# Patient Record
Sex: FEMALE | Race: WHITE
[De-identification: names, ages, dates, MRNs, and addresses within clinical notes are randomized per-mention and may not be internally consistent; named-entity substitution may affect disease eponyms.]

---

## 2017-06-11 ENCOUNTER — HOSPITAL ENCOUNTER (EMERGENCY)
Dept: HOSPITAL 7 - FB.ED | Age: 82
Discharge: HOME | End: 2017-06-11
Payer: MEDICARE

## 2017-06-11 VITALS — SYSTOLIC BLOOD PRESSURE: 139 MMHG | DIASTOLIC BLOOD PRESSURE: 50 MMHG

## 2017-06-11 DIAGNOSIS — I10: Primary | ICD-10-CM

## 2017-06-11 DIAGNOSIS — Z79.82: ICD-10-CM

## 2017-06-11 DIAGNOSIS — Z88.0: ICD-10-CM

## 2017-06-11 DIAGNOSIS — Z86.73: ICD-10-CM

## 2017-06-11 DIAGNOSIS — Z88.5: ICD-10-CM

## 2017-06-11 DIAGNOSIS — Z88.1: ICD-10-CM

## 2017-06-11 PROCEDURE — 36415 COLL VENOUS BLD VENIPUNCTURE: CPT

## 2017-06-11 PROCEDURE — 81001 URINALYSIS AUTO W/SCOPE: CPT

## 2017-06-11 PROCEDURE — 96361 HYDRATE IV INFUSION ADD-ON: CPT

## 2017-06-11 PROCEDURE — 96375 TX/PRO/DX INJ NEW DRUG ADDON: CPT

## 2017-06-11 PROCEDURE — 80048 BASIC METABOLIC PNL TOTAL CA: CPT

## 2017-06-11 PROCEDURE — 85025 COMPLETE CBC W/AUTO DIFF WBC: CPT

## 2017-06-11 PROCEDURE — 96374 THER/PROPH/DIAG INJ IV PUSH: CPT

## 2017-06-11 PROCEDURE — 99284 EMERGENCY DEPT VISIT MOD MDM: CPT

## 2017-06-12 NOTE — ER
DATE SEEN:  06/11/2017

 

HISTORY OF PRESENT ILLNESS:  The patient is an 86-year-old female, who presents

with dizziness.  She has difficulty seeing because of her glaucoma, has a

glaucoma procedure scheduled in a few weeks.  She says she has been getting

progressively more anxious about the procedure, has not been eating or drinking

well today, yesterday was able to drink okay.  Denies any headache.  She was

recently prescribed medication to help her sleep and help with her anxiety which

she just started taking them, but she is not sure if they have helped much.  Dr. Cortez prescribed those medications for her.  She said dizziness is more

pronounced when she goes from a sitting to standing position; when she is lying

flat, she does not have any.  She denies any shortness of breath or chest pain.

 

MEDICATIONS:

1. Timolol.

2. Mirtazapine.

3. Citalopram.

4. Gemfibrozil.

5. Aspirin.

6. Norvasc.

7. Levothyroxine.

8. Atenolol.

9. Lisinopril.

 

ALLERGIES:  Penicillin, prednisone, Bactrim.

 

PAST MEDICAL HISTORY:  Glaucoma, cerebral vascular accident, hypertension.

 

REVIEW OF SYSTEMS:  NEUROLOGIC:  Denies any focal weaknesses.

 

PHYSICAL EXAMINATION:  VITAL SIGNS:  Temperature is 36.7, pulse is 58, blood

pressure 151/72, respiratory rate 18, 98% on room air.  GENERAL:  She is no

apparent acute distress.  HEENT:  Pupils equal, round, responsive to light.

Extraocular muscles intact.  NECK:  Supple.  LUNGS:  Clear to auscultation.

HEART:  Regular rate and rhythm.  ABDOMEN:  Soft.  NEUROLOGICAL:  No focal

deficits.  No ataxia or dysmetria.  When performing a Hallpike maneuver, it does

not reproduce her symptoms.  Orthostatic vitals were taken and she is

orthostatic.

 

LABORATORY DATA:  CBC within normal limits.  Metabolic panel is within normal

limits.  Creatinine is 0.7.  Urinalysis within normal limits.

 

EMERGENCY DEPARTMENT COURSE:  She received a liter of normal saline, Zofran 4 mg

p.o.  She reports symptomatic improvement, able to walk more steady.  Dizziness

improved.  She also received Ativan 1 mg IV.

 

ASSESSMENT:  Dizziness, likely due to orthostatic hypotension and anxiety.

 

PLAN:  Continue current medications.  Increase fluid intake.  Follow up with

primary physician this week.

 

Job#: [id number]

DD: 06/11/2017 1833

DT: 06/12/2017 0233 IRAM

## 2017-06-13 ENCOUNTER — HOSPITAL ENCOUNTER (EMERGENCY)
Dept: HOSPITAL 7 - FB.ED | Age: 82
Discharge: HOME | End: 2017-06-13
Payer: MEDICARE

## 2017-06-13 VITALS — SYSTOLIC BLOOD PRESSURE: 156 MMHG | DIASTOLIC BLOOD PRESSURE: 72 MMHG

## 2017-06-13 DIAGNOSIS — R42: Primary | ICD-10-CM

## 2017-06-13 DIAGNOSIS — E03.9: ICD-10-CM

## 2017-06-14 NOTE — CR
INDICATION:  Dizziness, severe left fast component nystagmus.  



CHEST:  PA and lateral views of the chest, 06/13/2017, were compared with 09/19/
2008 and revealed the heart to be enlarged, the aorta tortuous and calcified in 
the arch and descending portion.  



Dextroconvex scoliosis of the upper middle thoracic spine is noted.  



Somewhat flattened diaphragm leaves, minimally prominent AP diameter, and mild 
hyperaeration, all suggest COPD along with interdigitation of diaphragm leaves.
  



A definite active infiltrate or effusion was not identified.  



IMPRESSION:  

1.  No acute process. 

2.  Probable progressive ASHD with cardiomegaly. 

3.  COPD. 

4.  Mild scoliosis.  

MTDD

## 2017-06-16 NOTE — ER
DATE SEEN:  2017

 

TIME SEEN:  The patient was seen at 1205 hours.

 

CHIEF COMPLAINT:  This 86-year-old woman comes in with history of dizziness,

nystagmus, anxiety, anorexia, depression, hypothyroidism, status post CVA

glaucoma with need for left eye surgery on  because the pressure is so

high, restlessness, dyslipidemia, and no history of falls.

 

HISTORY OF PRESENT ILLNESS:  The patient was seen 2 days ago in the emergency

room by Dr. Jones.  Noted to have dizziness, anorexia, anxiety, and Hallpike

maneuver was negative.  Laboratory studies were negative.  She was given Ativan

IV for her anxiety.  Daughter and the patient note that she had become just

weird, "dysphoric" after she left the ER.

 

She was flushed with IV fluids for orthostatic hypotension and now returns again

because she has persistent dizziness not resolved, meclizine does not help, and

anxiety is increased.

 

Decreased food intake, but she is drinking fluids.  She knows that the anorexia

and nausea is probably related to her dizziness.  She vomited the evening of

2017.  She is very anxious about having her upcoming cataract surgery on

.  Also anxious about her living situation.  Lives alone in an apartment

at [place].  Her daughter lives in [place] and another daughter lives

approximately 200 miles from here.

 

Apparently, she has less than an optimal relationship with her daughters, I am

not sure what that is all about.  See pertinent dictation below.

 

I later learned that one of her daughters - she is not on speaking terms.  They

have more or less a standoff relationship because apparently when her grandson

 she never spoke to her daughter, was never supportive, and the daughter

notes she has never been much of a mother to me in that regard.  Also, when the

patient's  went to the nursing home, she never visited.  The daughter

presents a history that she was quite selfish and she was quite cordial to other

people, but very much antagonistic to both daughters and not very much involved

with them nor does she listen to the daughters' advice.

 

According to one daughter, they were very concerned about her stability, her

walking, and want her very much to go to [facility].  The patient has been

very resistant to moving from her current place in [place] to [facility]

in [place].  It is an assisted living environment where she would be more

safe and the daughters feel she would have better care and better interaction

with people she knows.

 

No recent history of falls.

 

SIGNIFICANT PAST MEDICAL HISTORY:  Depression, CVA, hypothyroidism treated,

hypertension, right eye glaucoma, left eye is seriously elevated.  She has fear

of losing more of her eyesight in the right good eye.  Her left eye has very

poor vision.  Dyslipidemia, restless legs syndrome, and significant anxiety, and

documented 16 nystagmus on the chart and also had nystagmus on the last

visit, 2017, when seen by Dr. Jones.

 

Medications have not been changed recently.

 

She denies dehydration.  She feels she is eating and drinking, but her p.o.

intake and liquid intake is obviously decreased because she has had this

persistent dizziness and that is getting worse.

 

REVIEW OF SYSTEMS:  CARDIORESPIRATORY:  The patient denies chest pain, shortness

of breath, cough, or irregular heartbeat.  HEENT:  Denies headaches, difficulty

swallowing, or decreased hearing.  She denies neck pain or falls.

GASTROINTESTINAL:  Denies abdominal discomfort, diarrhea, constipation, change

in bowels, blood in her stool, black or tarry stool, reflux.  : Denies any

incontinence.  MUSCULOSKELETAL:  Denies joint pain, aching, or weakness, but she

uses a cane periodically.  NEUROLOGIC:  Previous CVA.  Denies seizures or

falling to one side or weakness of one extremity.

 

PHYSICAL EXAMINATION:  VITAL SIGNS:  Blood pressure 160/69, respirations 15,

heart rate 71 and regular, and oxygen saturation 98% on room air.  Orthostatic

blood pressure supine 152/83 and heart rate 89, sitting 165/83 and heart rate

53, standing blood pressure 161/77 and heart rate 57.  No evidence for

orthostatic changes.  In fact, it is surprising that with sitting and standing

her heart rate would come down as opposed to increase.  Perhaps the Tenormin has

caused her heart rate to go down as opposed to having a sympathetic response to

elevate.  She is taking Tenormin 50 mg b.i.d.  HEENT:  The patient has very

notable left fast component nystagmus, rapid beats.  I did not look into the

eyes - I did not check the retinas.  EOMs, she has conjugate gaze in spite of

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  She can sense light, but has very poor vision in

the left eye.  Hearing is good.  TMs negative.  NECK:  Supple.  No bruits in the

neck.  Swelling is appropriate.  Uvula midline.  Tongue is midline.  LUNGS:

Clear to auscultation without rales, rhonchi, or wheezes.  HEART:  S1, S2.  No

murmur.  No irregular rate or rhythm.  No S3, no S4.  ABDOMEN:  Soft.  No

guarding.  No abdominal discomfort.  NEUROLOGIC:  Deep tendon reflexes, upper

extremities hypoactive, absent knee jerks and ankle jerks.  Muscle strength in

dorsiflexion, plantar flexion of the foot is excellent.  Muscle strength left

and right upper extremity symmetrical and good.  Hand  is good.  She pushes

and pulls well.  Pronator drift negative.  Cranial nerves 2 through 12 intact

except for the vision.  No asymmetry of facial folds.  Gait is stable but

somewhat unsure because her vision is poor.  Romberg is negative.

 

LABORATORY DATA:  CPK is normal - no suggestion of myocardial infarction or

rhabdomyolysis from sitting around or lying around too long.  TSH is not

abnormal.  CMP and CBC are normal.  EKG; sinus rhythm in the 50s.

 

Chest x-ray, no infiltrate.  She has trace cardiomegaly, the transcardiac versus

transthoracic ratio is slightly over 50%, so it suggests very slight

cardiomegaly.  Her daughter notes that she had one chamber abnormality on an

echo that had previously been discussed and "it was slightly enlarged and less

functional than other chambers."  This suggests perhaps there may be some left

ventricular bulbous quality, but I think that is an overcall on the chest x-ray.

 

PROCEDURE:  I did perform a Hallpike maneuver.  It seemed to be more accentuated

on the left with the left lateral head slightly turned to the left compared to

the right.  With the fast component to the left, the Epley maneuver was

performed and did not make any difference in her dizziness.  However, with the

Epley maneuver, intermittently the nystagmus would completely relent.  It

relented for about a minute, then returned to same.  Also, when she would sit

up, the nystagmus relented.  When I tried to test the right side of her head,

the left-sided fast nystagmus persisted.  When I performed the lateral Epley 
maneuver - had her lie down and roll her

body with the head almost on the floor (at least 1.5 feet below the gurney

surface) and had her sit up rapidly, the

nystagmus relented for about a minute, and then came back at the same level it

was before.

 

The patient had Epley maneuver performed in physical therapy by Philip, the chief

physical therapist, and he felt this was not a semicircular canal-mediated

nystagmus.  He plans to see her later this week on Thursday, in 2 days, and

repeat more testing.  He did not feel there was a horizontal canal, semi-

circular canal abnormality.  It was my deduction that perhaps this was a

horizontal semicircular canal, as nystagmus on this visit was always been 
lateral and never

been vertical or oblique. Perhaps if Benji cannot get  resolution of  the 
vertigo with the sophisticated PT maneuvers she may have to go to vertigo 
clinic where computerized goggle measurement can confirm the presence or 
absence of semicircular canal  abnormalities. 

 

ASSESSMENT:

1. Dizziness and nystagmus are associated.  Does not appear to be a benign

    paroxysmal positional vertigo.

2. Etiology for dizziness is indeterminate.  Surprisingly, this does relent

    when she sits up for a minute or when she would rotate to the side, left or

    right with her head over the gurney, and closer to the floor position.

3. The latter two findings suggest there is some indirect relationship to

    semicircular canals.  Perhaps, further testing may elucidate this.

    However, I am not optimistic.  The surprising decrease of her vertigo with

    sitting up, may relate this is a blood pressure issue.  Perhaps the patient

    needs to have a higher blood pressure and the atenolol dose could be tried

    at a lower dose.  This is a guess.  The only way to know is to perform a

    trial of this and require more medical vigilance and closer followup.  I

    did not change her medicines in that regard.

 

OTHER DIAGNOSES:

1. Status post cerebrovascular accident. It is possibly the cerebrovascular

    accident may have affected her balance centers or the nystagmus and this is

    permanently present since the cerebrovascular accident.

2. Hypothyroidism, treated, stable.  TSH is normal.

3. Glaucoma increasing pain in left eye, perhaps the glaucoma may have

    something to do with accentuated nystagmus, but she has had nystagmus since

    2016 documented on the chart, so I think this is more a long-term

    neurological injury.  Perhaps the cerebrovascular accident that she

    experienced years ago is the cause of this.

4. Dyslipidemia.

5. Restless legs syndrome.

6. The patient did not respond to meclizine.

7. She became dysphoric with 1 mg of Ativan.

 

PLAN:

1. A trial of very low doses of clonazepam 0.25 mg or 0.5 mg b.i.d.  She will

    need to have the staff to be vigilant about her gait.  Should this cause

    her dizziness, she should discontinue this trial.

2. Her anxiety seems to be an overriding issue in her life.  She has

    apparently burned the bridges between her daughters and they are less than

    motivated to work with her since she has been contrarian to them most of

    their life.  The patient is to follow up with doctor in a week.

3. Also, the most important issue that was addressed today was the subsequent

    living place.  She has realized that she needs to change places where she

    lives and needs to go to [facility] for security.  Otherwise, I am

    afraid she would need to go directly to a nursing home for further care.

    She came to this conclusion, but that was not something that I pushed her

    to do, but have reinforced that.  Her daughters will be working to

    establish this and to help her make this transition.

 

Job#: [id number]

DD: 2017 [id number]

DT: 2017 EMERALD/SIM HAIDER

## 2017-07-02 ENCOUNTER — HOSPITAL ENCOUNTER (OUTPATIENT)
Dept: HOSPITAL 7 - FB.ED | Age: 82
Setting detail: OBSERVATION
LOS: 1 days | Discharge: HOME | End: 2017-07-03
Attending: FAMILY MEDICINE | Admitting: EMERGENCY MEDICINE
Payer: MEDICARE

## 2017-07-02 DIAGNOSIS — Z88.0: ICD-10-CM

## 2017-07-02 DIAGNOSIS — Z79.82: ICD-10-CM

## 2017-07-02 DIAGNOSIS — Z88.1: ICD-10-CM

## 2017-07-02 DIAGNOSIS — R42: ICD-10-CM

## 2017-07-02 DIAGNOSIS — E87.1: ICD-10-CM

## 2017-07-02 DIAGNOSIS — Z98.890: ICD-10-CM

## 2017-07-02 DIAGNOSIS — I10: Primary | ICD-10-CM

## 2017-07-02 DIAGNOSIS — Z88.8: ICD-10-CM

## 2017-07-02 DIAGNOSIS — Z79.899: ICD-10-CM

## 2017-07-02 DIAGNOSIS — R11.0: ICD-10-CM

## 2017-07-02 DIAGNOSIS — E03.9: ICD-10-CM

## 2017-07-02 DIAGNOSIS — F41.9: ICD-10-CM

## 2017-07-02 DIAGNOSIS — Z90.49: ICD-10-CM

## 2017-07-02 DIAGNOSIS — Z90.710: ICD-10-CM

## 2017-07-02 DIAGNOSIS — Z88.2: ICD-10-CM

## 2017-07-02 PROCEDURE — 87186 SC STD MICRODIL/AGAR DIL: CPT

## 2017-07-02 PROCEDURE — G0378 HOSPITAL OBSERVATION PER HR: HCPCS

## 2017-07-02 PROCEDURE — 81001 URINALYSIS AUTO W/SCOPE: CPT

## 2017-07-02 PROCEDURE — 93005 ELECTROCARDIOGRAM TRACING: CPT

## 2017-07-02 PROCEDURE — 99285 EMERGENCY DEPT VISIT HI MDM: CPT

## 2017-07-02 PROCEDURE — 87088 URINE BACTERIA CULTURE: CPT

## 2017-07-02 PROCEDURE — 80048 BASIC METABOLIC PNL TOTAL CA: CPT

## 2017-07-02 PROCEDURE — 96360 HYDRATION IV INFUSION INIT: CPT

## 2017-07-02 PROCEDURE — 96361 HYDRATE IV INFUSION ADD-ON: CPT

## 2017-07-02 PROCEDURE — 85027 COMPLETE CBC AUTOMATED: CPT

## 2017-07-02 PROCEDURE — 84484 ASSAY OF TROPONIN QUANT: CPT

## 2017-07-02 PROCEDURE — 36415 COLL VENOUS BLD VENIPUNCTURE: CPT

## 2017-07-02 PROCEDURE — 87086 URINE CULTURE/COLONY COUNT: CPT

## 2017-07-02 RX ADMIN — ATENOLOL SCH MG: 100 TABLET ORAL at 21:24

## 2017-07-02 NOTE — EDM.PDOC
ED HPI GENERAL MEDICAL PROBLEM





- General


Chief Complaint: General


Stated Complaint: LIGHT HEADED, WEAK AND ABD PAIN


Time Seen by Provider: 17 13:00


Source of Information: Reports: Patient, Old Records


History Limitations: Reports: No Limitations





- History of Present Illness


INITIAL COMMENTS - FREE TEXT/NARRATIVE: 





87 yo female here with a 2 week hx of nausea that is worse today. Had been seen 

for these sx's here about 2 weeks ago and nothing was found. Says she vomited a 

couple times today. Is mostly blind. Arrives via EMS. Reports a mild HA. No 

diarrhea. Some mild diffuse abdominal pain. Denies vertigo. Has anorexia. Had 

some chills today. No dysuria or cough or SOB. No chest pain. 


Onset: Other (approx 2 weeks ago.)


Duration: Week(s):, Getting Worse


Location: Reports: Head, Abdomen


Quality: Reports: Dull


Severity: Moderate


Improves with: Reports: None


Worsens with: Reports: Eating


Context: Reports: Other (unknown cause, recent negative work up. )


Associated Symptoms: Reports: Fever/Chills (no fever, only chills), Headaches (

mild today), Loss of Appetite, Malaise, Nausea/Vomiting, Syncope (not today, 

before last ER visit).  Denies: Chest Pain, Cough, Diaphoresis, Rash, Seizure, 

Shortness of Breath


Treatments PTA: Reports: Other (see below) (none today)


  ** Middle Abdomen


Pain Score (Numeric/FACES): 7





- Related Data


 Allergies











Allergy/AdvReac Type Severity Reaction Status Date / Time


 


Penicillins Allergy  Hives Verified 17 13:00


 


prednisone Allergy  Cannot Verified 17 13:00





   Remember  


 


sulfamethoxazole Allergy  Cannot Verified 17 13:00





[From Bactrim]   Remember  


 


Tetanus Vaccines and Toxoid Allergy  Swelling Verified 17 13:00


 


trimethoprim [From Bactrim] Allergy  Cannot Verified 17 13:00





   Remember  


 


steroids Allergy  Swollen Uncoded 17 13:00





   Eyes  











Home Meds: 


 Home Meds





Atenolol [Tenormin] 50 mg PO BID 16 [History]


Bimatoprost [LUMIGAN 0.01% Ophth Soln] 1 drop EYEBOTH BEDTIME 16 [History]


Levothyroxine 75 mg PO DAILY 16 [History]


Lisinopril 40 mg PO DAILY 16 [History]


Timolol Maleate [Timoptic 0.5% Ophth Soln] 1 drop EYEBOTH DAILY 16 [

History]


amLODIPine [Norvasc] 2.5 mg PO DAILY 16 [History]


Aspirin [Adult Low Dose Aspirin EC] 81 mg PO DAILY 17 [History]


Gemfibrozil [Lopid] 600 mg PO DAILY 17 [History]


ClonazePAM [KlonoPIN] 0.25 mg PO BID #20 tablet 17 [Rx]


Lutein/Minerals/Vit A,C & E [Ocuvite] 1 tab PO DAILY 17 [History]


Citalopram [Citalopram HBr] 10 mg PO DAILY 17 [History]


Ondansetron [Ondansetron Odt] 4 mg PO Q8HR 17 [History]











Past Medical History


HEENT History: Reports: Glaucoma


Cardiovascular History: Reports: Hypertension


Other Cardiovascular History: "MY HEART SKIPS A BEAT"


OB/GYN History: Reports: Pregnancy


Other OB/BYN History: 


Endocrine/Metabolic History: Reports: Other (See Below)


Other Endocrine/Metabolic History: THYROID





- Past Surgical History


HEENT Surgical History: Reports: Tonsillectomy


Female  Surgical History: Reports: Hysterectomy





Social & Family History





- Tobacco Use


Smoking Status *Q: Never Smoker


Second Hand Smoke Exposure: No





- Caffeine Use


Caffeine Use: Reports: Soda





- Recreational Drug Use


Recreational Drug Use: No





ED ROS GENERAL





- Review of Systems


Review Of Systems: See Below


Constitutional: Reports: Malaise


HEENT: Reports: No Symptoms


Respiratory: Reports: No Symptoms


Cardiovascular: Reports: No Symptoms


GI/Abdominal: Reports: Abdominal Pain (mild diffuse), Anorexia, Decreased 

Appetite, Nausea, Vomiting.  Denies: Constipation, Diarrhea, Distension, 

Hematemesis, Hematochezia, Melena


: Reports: No Symptoms


Musculoskeletal: Reports: No Symptoms


Skin: Reports: No Symptoms


Neurological: Reports: Dizziness (mild)


Psychiatric: Reports: No Symptoms





ED EXAM, GENERAL





- Physical Exam


Exam: See Below


Exam Limited By: No Limitations


General Appearance: Alert, WD/WN, No Apparent Distress


Eye Exam: Bilateral Eye: EOMI, Normal Inspection, PERRL


Ears: Normal External Exam, Normal Canal, Hearing Grossly Normal, Normal TMs


Ear Exam: Bilateral Ear: Auricle Normal, Canal Normal, TM normal


Nose: Normal Inspection, Normal Mucosa, No Blood


Throat/Mouth: Normal Inspection, Normal Lips, Normal Teeth, Normal Oropharynx, 

Normal Voice, No Airway Compromise


Head: Atraumatic, Normocephalic


Neck: Normal Inspection, Supple, Non-Tender


Respiratory/Chest: No Respiratory Distress, Lungs Clear, Normal Breath Sounds, 

No Accessory Muscle Use


Cardiovascular: Regular Rate, Rhythm, No Edema


GI/Abdominal: Normal Bowel Sounds, Soft, Non-Tender, No Distention


Back Exam: Normal Inspection


Extremities: Normal Inspection, Normal Range of Motion, Non-Tender, No Pedal 

Edema


Neurological: Alert, Oriented, CN II-XII Intact, Normal Cognition, No Motor/

Sensory Deficits


Psychiatric: Normal Affect, Normal Mood


Skin Exam: Warm, Dry, Intact, Normal Color, No Rash


Lymphatic: No Adenopathy





EKG INTERPRETATION


EKG Date: 17


Time: 13:10


Rhythm: NSR


Rate (Beats/Min): 56


Axis: Normal


P-Wave: Present


QRS: Normal


ST-T: Normal


QT: Normal


Comparison: No Change





Course





- Vital Signs


Text/Narrative:: 





Alprazolam 0.25 mg po, Zofran ODT 4 mg SL, Toradol 15 mg IM, amlodipine 2.5 mg 

po, atenolol 50 mg po, lisinopril 40 mg po, NS 1000 ml IV, cephalexin 500 mg po

, Maalox 30 ml po, Dulcolax supp 1 VT(declined), acetaminophen 1000 mg po


Last Recorded V/S: 


 Last Vital Signs











Temp  37.2 C   17 13:02


 


Pulse  56 L  17 13:02


 


Resp  16   17 13:02


 


BP  160/59 H  17 14:25


 


Pulse Ox  100   17 13:02














- Orders/Labs/Meds


Orders: 


 Active Orders 24 hr











 Category Date Time Status


 


 CULTURE URINE [RM] Stat Lab  17 13:55 Received


 


 Sodium Chloride 0.9% [Normal Saline] 1,000 ml Med  17 14:15 Active





 IV ASDIRECTED   


 


 Sodium Chloride 0.9% [Saline Flush] Med  17 13:24 Active





 10 ml FLUSH ASDIRECTED PRN   


 


 Saline Lock Insert [OM.PC] Routine Oth  17 13:24 Ordered


 


 EKG 12 Lead [EK] Routine Ther  17 13:07 Ordered








 Medication Orders





Sodium Chloride (Normal Saline)  1,000 mls @ 1,000 mls/hr IV ASDIRECTED THAIS


   Last Admin: 17 14:30  Dose: 1,000 mls/hr


Sodium Chloride (Saline Flush)  10 ml FLUSH ASDIRECTED PRN


   PRN Reason: Keep Vein Open


   Last Admin: 17 13:44  Dose: 10 ml








Labs: 


 Laboratory Tests











  17 Range/Units





  13:25 13:25 13:25 


 


WBC  5.8    (4.5-12.0)  X10-3/uL


 


RBC  4.40    (3.23-5.20)  x10(6)uL


 


Hgb  14.1    (11.5-15.5)  g/dL


 


Hct  41.7    (30.0-51.3)  %


 


MCV  94.8    (80-96)  fL


 


MCH  32.1    (27.7-33.6)  pg


 


MCHC  33.9    (32.2-35.4)  g/dL


 


RDW  11.8    (11.5-15.5)  %


 


Plt Count  226    (125-369)  X10(3)uL


 


Sodium   127 L   (135-145)  mmol/L


 


Potassium   5.0   (3.5-5.3)  mmol/L


 


Chloride   92 L D   (100-110)  mmol/L


 


Carbon Dioxide   25   (23-29)  mmol/L


 


BUN   10   (8-23)  mg/dL


 


Creatinine   0.8   (0.6-1.3)  mg/dL


 


Est Cr Clr Drug Dosing   TNP   


 


Estimated GFR (MDRD)   > 60   (>60)  


 


BUN/Creatinine Ratio   12.5   (9-20)  


 


Glucose   122 H   ()  mg/dL


 


Calcium   9.5   (8.6-10.2)  mg/dL


 


Troponin I    < 0.01 L  (0.02-0.06)  NG/ML


 


Urine Color     (YELLOW)  


 


Urine Appearance     (CLEAR)  


 


Urine pH     (5.0-6.5)  


 


Ur Specific Gravity     (1.010-1.025)  


 


Urine Protein     (NEGATIVE)  mg/dL


 


Urine Glucose (UA)     (NEGATIVE)  mg/dL


 


Urine Ketones     (NEGATIVE)  mg/dL


 


Urine Occult Blood     (NEGATIVE)  


 


Urine Nitrite     (NEGATIVE)  


 


Urine Bilirubin     (NEGATIVE)  


 


Urine Urobilinogen     (NEGATIVE)  mg/dL


 


Ur Leukocyte Esterase     (NEGATIVE)  


 


Urine WBC     (0)  


 


Ur Squamous Epith Cells     (NS,R,O)  


 


Urine Bacteria     (NS)  














  17 Range/Units





  13:55 


 


WBC   (4.5-12.0)  X10-3/uL


 


RBC   (3.23-5.20)  x10(6)uL


 


Hgb   (11.5-15.5)  g/dL


 


Hct   (30.0-51.3)  %


 


MCV   (80-96)  fL


 


MCH   (27.7-33.6)  pg


 


MCHC   (32.2-35.4)  g/dL


 


RDW   (11.5-15.5)  %


 


Plt Count   (125-369)  X10(3)uL


 


Sodium   (135-145)  mmol/L


 


Potassium   (3.5-5.3)  mmol/L


 


Chloride   (100-110)  mmol/L


 


Carbon Dioxide   (23-29)  mmol/L


 


BUN   (8-23)  mg/dL


 


Creatinine   (0.6-1.3)  mg/dL


 


Est Cr Clr Drug Dosing   


 


Estimated GFR (MDRD)   (>60)  


 


BUN/Creatinine Ratio   (9-20)  


 


Glucose   ()  mg/dL


 


Calcium   (8.6-10.2)  mg/dL


 


Troponin I   (0.02-0.06)  NG/ML


 


Urine Color  Yellow  (YELLOW)  


 


Urine Appearance  Slightly cloudy  (CLEAR)  


 


Urine pH  7.0 H  (5.0-6.5)  


 


Ur Specific Gravity  1.010  (1.010-1.025)  


 


Urine Protein  Negative  (NEGATIVE)  mg/dL


 


Urine Glucose (UA)  Normal  (NEGATIVE)  mg/dL


 


Urine Ketones  Negative  (NEGATIVE)  mg/dL


 


Urine Occult Blood  Negative  (NEGATIVE)  


 


Urine Nitrite  Negative  (NEGATIVE)  


 


Urine Bilirubin  Negative  (NEGATIVE)  


 


Urine Urobilinogen  Normal  (NEGATIVE)  mg/dL


 


Ur Leukocyte Esterase  Moderate H  (NEGATIVE)  


 


Urine WBC  20-30 H  (0)  


 


Ur Squamous Epith Cells  Few H  (NS,R,O)  


 


Urine Bacteria  Moderate H  (NS)  











Meds: 


Medications











Generic Name Dose Route Start Last Admin





  Trade Name Freq  PRN Reason Stop Dose Admin


 


Sodium Chloride  1,000 mls @ 1,000 mls/hr  17 14:15  17 14:30





  Normal Saline  IV   1,000 mls/hr





  ASDIRECTED THAIS   Administration


 


Sodium Chloride  10 ml  17 13:24  17 13:44





  Saline Flush  FLUSH   10 ml





  ASDIRECTED PRN   Administration





  Keep Vein Open   














Discontinued Medications














Generic Name Dose Route Start Last Admin





  Trade Name Freq  PRN Reason Stop Dose Admin


 


Acetaminophen  1,000 mg  17 16:33  17 16:39





  Tylenol Extra Strength  PO  17 16:34  1,000 mg





  ONETIME ONE   Administration


 


Al Hydroxide/Mg Hydroxide  30 ml  17 15:53  17 16:39





  Mag-Al Susp  PO  17 15:54  30 ml





  NOW STA   Administration


 


Alprazolam  0.25 mg  17 13:37  17 13:43





  Xanax  PO  17 13:38  0.25 mg





  NOW ONE   Administration


 


Amlodipine Besylate  2.5 mg  17 13:59  17 14:24





  Norvasc  PO  17 14:00  2.5 mg





  ONETIME ONE   Administration


 


Atenolol  50 mg  17 13:59  17 14:25





  Tenormin  PO  17 14:00  50 mg





  ONETIME ONE   Administration


 


Bisacodyl  10 mg  17 15:53  17 16:39





  Dulcolax  RECTAL  17 15:54  10 mg





  ONETIME ONE   Administration


 


Cephalexin  500 mg  17 14:27  17 14:47





  Keflex  PO  17 14:28  500 mg





  ONETIME ONE   Administration


 


Ketorolac Tromethamine  15 mg  17 13:10  17 13:14





  Toradol  IM  17 13:11  15 mg





  ONETIME ONE   Administration


 


Lisinopril  40 mg  17 13:59  17 14:25





  Prinivil  PO  17 14:00  40 mg





  NOW STA   Administration


 


Ondansetron HCl  4 mg  17 13:02  17 13:11





  Zofran Odt  PO  17 13:03  4 mg





  ONETIME ONE   Administration














Departure





- Departure


Time of Disposition: 17:15


Disposition: Refer to Observation


Condition: Fair


Clinical Impression: 


 Hyponatremia, Dizziness, Nausea








- Discharge Information


Forms:  ED Department Discharge





- My Orders


Last 24 Hours: 


My Active Orders





17 13:07


EKG 12 Lead [EK] Routine 





17 13:24


Sodium Chloride 0.9% [Saline Flush]   10 ml FLUSH ASDIRECTED PRN 


Saline Lock Insert [OM.PC] Routine 





17 13:55


CULTURE URINE [RM] Stat 





17 14:15


Sodium Chloride 0.9% [Normal Saline] 1,000 ml IV ASDIRECTED 














- Assessment/Plan


Last 24 Hours: 


My Active Orders





17 13:07


EKG 12 Lead [EK] Routine 





17 13:24


Sodium Chloride 0.9% [Saline Flush]   10 ml FLUSH ASDIRECTED PRN 


Saline Lock Insert [OM.PC] Routine 





17 13:55


CULTURE URINE [RM] Stat 





17 14:15


Sodium Chloride 0.9% [Normal Saline] 1,000 ml IV ASDIRECTED

## 2017-07-03 VITALS — DIASTOLIC BLOOD PRESSURE: 70 MMHG | SYSTOLIC BLOOD PRESSURE: 147 MMHG

## 2017-07-03 RX ADMIN — ATENOLOL SCH MG: 100 TABLET ORAL at 10:20

## 2017-07-03 NOTE — PCM.HP
H&P History of Present Illness





- General


Date of Service: 17


Admit Problem/Dx: 


 Admission Diagnosis/Problem





Admission Diagnosis/Problem      Hyponatremia








Source of Information: Patient, EMS Notes Reviewed, Old Records


History Limitations: Reports: No Limitations





- History of Present Illness


Initial Comments - Free Text/Narative: 





86-year-old female was admitted because of dizziness. The dizziness is gone for 

at least 2 months. The cause has been indeterminate. She has a history of CVA 

and is thought that could contribute, but she also has a history of anxiety and 

difficult to control. She lives at home alone, and has had problems with 

glaucoma as well. A plan is in place to admit her to [facility]. She denies 

any fever this morning noted that she have any nausea or vomiting she does have 

a headache that is mild to moderate. She has a history of hypertension 

dyslipidemia previously been well controlled.


  ** Middle Abdomen


Pain Score (Numeric/FACES): 7





  ** Headache


Pain Score (Numeric/FACES): 4





- Related Data


Allergies/Adverse Reactions: 


 Allergies











Allergy/AdvReac Type Severity Reaction Status Date / Time


 


Penicillins Allergy  Hives Verified 17 13:00


 


prednisone Allergy  Cannot Verified 17 13:00





   Remember  


 


sulfamethoxazole Allergy  Cannot Verified 17 13:00





[From Bactrim]   Remember  


 


Tetanus Vaccines and Toxoid Allergy  Swelling Verified 17 13:00


 


trimethoprim [From Bactrim] Allergy  Cannot Verified 17 13:00





   Remember  


 


steroids Allergy  Swollen Uncoded 17 13:00





   Eyes  











Home Medications: 


 Home Meds





Atenolol [Tenormin] 50 mg PO BID 16 [History]


Bimatoprost [LUMIGAN 0.01% Ophth Soln] 1 drop EYEBOTH BEDTIME 16 [History]


Levothyroxine 75 mg PO DAILY 16 [History]


Lisinopril 40 mg PO DAILY 16 [History]


Timolol Maleate [Timoptic 0.5% Ophth Soln] 1 drop EYEBOTH DAILY 16 [

History]


amLODIPine [Norvasc] 2.5 mg PO DAILY 16 [History]


Aspirin [Adult Low Dose Aspirin EC] 81 mg PO DAILY 17 [History]


Gemfibrozil [Lopid] 600 mg PO DAILY 17 [History]


ClonazePAM [KlonoPIN] 0.25 mg PO BID #20 tablet 17 [Rx]


Lutein/Minerals/Vit A,C & E [Ocuvite] 1 tab PO DAILY 17 [History]


Citalopram [Citalopram HBr] 10 mg PO DAILY 17 [History]


Ondansetron [Ondansetron Odt] 4 mg PO Q8HR 17 [History]











Past Medical History


HEENT History: Reports: Glaucoma


Cardiovascular History: Reports: Hypertension


Other Cardiovascular History: "MY HEART SKIPS A BEAT"


Respiratory History: Reports: None


Genitourinary History: Reports: None


OB/GYN History: Reports: Pregnancy


Other OB/BYN History: 


Musculoskeletal History: Reports: None


Neurological History: Reports: None


Endocrine/Metabolic History: Reports: Hypothyroidism, Other (See Below)


Other Endocrine/Metabolic History: THYROID


Hematologic History: Reports: None


Immunologic History: Reports: None


Oncologic (Cancer) History: Reports: None


Dermatologic History: Reports: None





- Infectious Disease History


Infectious Disease History: Reports: None





- Past Surgical History


Head Surgeries/Procedures: Reports: None


HEENT Surgical History: Reports: Tonsillectomy


Respiratory Surgical History: Reports: None


GI Surgical History: Reports: Appendectomy, Cholecystectomy


Female  Surgical History: Reports: Hysterectomy


Neurological Surgical History: Reports: None


Musculoskeletal Surgical History: Reports: None





Social & Family History





- Family History


Family Medical History: Noncontributory





- Tobacco Use


Smoking Status *Q: Never Smoker


Second Hand Smoke Exposure: No





- Caffeine Use


Caffeine Use: Reports: Soda


Other Caffeine Use: diet.





- Recreational Drug Use


Recreational Drug Use: No





H&P Review of Systems





- Review of Systems:


Review Of Systems: ROS reveals no pertinent complaints other than HPI.





Exam





- Exam


Exam: See Below





- Vital Signs


Vital Signs: 


 Last Vital Signs











Temp  96.9 F   17 04:25


 


Pulse  51 L  17 04:25


 


Resp  18   17 04:25


 


BP  149/69 H  17 04:25


 


Pulse Ox  98   17 04:25











Weight: 73.437 kg





- Exam


General: Alert, Oriented, 4


HEENT: PERRLA, Hearing Intact, Mucosa Moist & Pink, Nares Patent, Normal Nasal 

Septum, Posterior Pharynx Clear, Conjunctiva Clear, EOMI, EACs Clear, TMs Clear


Neck: Supple, Trachea Midline, 2


Lungs: Clear to Auscultation, Normal Respiratory Effort


Cardiovascular: Regular Rate, Regular Rhythm


Abdomen: Normal Bowel Sounds, Soft


 (Female) Exam: Deferred


Rectal (Female) Exam: Deferred


Back Exam: Normal Inspection, Full Range of Motion, NT


Extremities: 3, Normal Inspection, 10


Skin: Warm, Dry, Intact


Neurological: Cranial Nerves Intact, Reflexes Equal Bilateral


Neuro Extensive - Mental Status: Alert, Oriented x3, Normal Mood/Affect, Normal 

Cognition


Neuro Extensive - Motor, Sensory, Reflexes: CN II-XII Intact, Normal Gait, 

Normal Reflexes


Psychiatric: Alert, Normal Affect, Normal Mood





- Patient Data


Lab Results Last 24 hrs: 


 Laboratory Results - last 24 hr











  17 Range/Units





  06:30 


 


Sodium  132 L  (135-145)  mmol/L


 


Potassium  4.3  (3.5-5.3)  mmol/L


 


Chloride  99 L D  (100-110)  mmol/L


 


Carbon Dioxide  27  (23-29)  mmol/L


 


BUN  12  (8-23)  mg/dL


 


Creatinine  0.8  (0.6-1.3)  mg/dL


 


Est Cr Clr Drug Dosing  41.76  mL/min


 


Estimated GFR (MDRD)  > 60  (>60)  


 


BUN/Creatinine Ratio  15.0  (9-20)  


 


Glucose  92  ()  mg/dL


 


Calcium  8.4 L  (8.6-10.2)  mg/dL











Result Diagrams: 


 17 13:25





 17 06:30





*Q Meaningful Use (ADM)





- VTE *Q


VTE Criteria *Q: 








- Stroke *Q


Stroke Criteria *Q: 








- AMI *Q


AMI Criteria *Q: 








- Problem List


(1) HTN (hypertension)


SNOMED Code(s): 93891049


   ICD Code: I10 - ESSENTIAL (PRIMARY) HYPERTENSION   Status: Acute   Current 

Visit: Yes   


Qualifiers: 


   Hypertension type: essential hypertension   Qualified Code(s): I10 - 

Essential (primary) hypertension   





(2) Dizziness


SNOMED Code(s): 371983172, 975540589


   ICD Code: R42 - DIZZINESS AND GIDDINESS   Status: Acute   Current Visit: Yes

   





(3) Hyponatremia


SNOMED Code(s): 08620525


   ICD Code: E87.1 - HYPO-OSMOLALITY AND HYPONATREMIA   Status: Acute   Current 

Visit: Yes   





(4) Nausea


SNOMED Code(s): 369692086


   ICD Code: R11.0 - NAUSEA   Status: Acute   Current Visit: Yes   





(5) Anxiety


SNOMED Code(s): 68921360


   ICD Code: F41.9 - ANXIETY DISORDER, UNSPECIFIED   Status: Acute   Current 

Visit: Yes   


Problem List Initiated/Reviewed/Updated: Yes


Orders Last 24hrs: 


 Active Orders 24 hr











 Category Date Time Status


 


 Acetaminophen [Tylenol] Med  17 04:20 Active





 650 mg PO Q4H PRN   


 


 Aspirin [Halfprin] Med  17 09:00 Active





 81 mg PO DAILY   


 


 Atenolol [Tenormin] Med  17 21:00 Active





 50 mg PO BID   


 


 Bimatoprost [LUMIGAN 0.01% Ophth Soln] Med  17 21:00 Active





 0 ml EYEBOTH BEDTIME   


 


 Citalopram [Celexa] Med  17 09:00 Active





 10 mg PO DAILY   


 


 ClonazePAM [KlonoPIN] Med  17 21:00 Active





 0.25 mg PO BID   


 


 Gemfibrozil [Lopid] Med  17 09:00 Active





 600 mg PO DAILY   


 


 Levothyroxine Med  17 09:00 Active





 75,000 mcg PO DAILY   


 


 Lisinopril [Prinivil] Med  17 09:00 Active





 40 mg PO DAILY   


 


 Sodium Chloride 0.9% [Normal Saline] 1,000 ml Med  17 17:15 Active





 IV ASDIRECTED   


 


 Timolol Maleate [Timoptic 0.5% Ophth Soln] Med  17 09:00 Active





 0 ml EYEBOTH DAILY   


 


 amLODIPine [Norvasc] Med  17 09:00 Active





 2.5 mg PO DAILY   








 Medication Orders





Acetaminophen (Tylenol)  650 mg PO Q4H PRN


   PRN Reason: Headache


   Last Admin: 17 04:36  Dose: 650 mg


Amlodipine Besylate (Norvasc)  2.5 mg PO DAILY THAIS


Aspirin (Halfprin)  81 mg PO DAILY THAIS


Atenolol (Tenormin)  50 mg PO BID THAIS


   Last Admin: 17 21:24  Dose: 50 mg


Bimatoprost (Lumigan 0.01% Ophth Soln)  0 ml EYEBOTH BEDTIME THAIS


   Last Admin: 17 21:25  Dose: 1 drop


Citalopram Hydrobromide (Celexa)  10 mg PO DAILY [facility]


Clonazepam (Klonopin)  0.25 mg PO BID THAIS


   Last Admin: 17 22:18  Dose: 0.25 mg


Docusate Sodium (Colace)  100 mg PO BID PRN


   PRN Reason: Constipation


Gemfibrozil (Lopid)  600 mg PO DAILY [facility]


Sodium Chloride (Normal Saline)  1,000 mls @ 1,000 mls/hr IV ASDIRECTED THAIS


   Last Infusion: 17 17:37  Dose: 75 mls/hr


   Admin: 17 14:30  Dose: 1,000 mls/hr


Sodium Chloride (Normal Saline)  1,000 mls @ 75 mls/hr IV ASDIRECTED [facility]


   Last Admin: 17 06:29  Dose: 75 mls/hr


Levothyroxine Sodium (Levothyroxine)  75,000 mcg PO DAILY [facility]


Lisinopril (Prinivil)  40 mg PO DAILY [facility]


Ondansetron HCl (Zofran Odt)  4 mg PO Q6H PRN


   PRN Reason: nausea, able to take PO


Sodium Chloride (Saline Flush)  10 ml FLUSH ASDIRECTED PRN


   PRN Reason: Keep Vein Open


   Last Admin: 17 13:44  Dose: 10 ml


Timolol Maleate (Timoptic 0.5% Ophth Soln)  0 ml EYEBOTH DAILY [facility]








Assessment/Plan Comment:: 





Her sodium has improved this morning up to 132. She reports that as nausea and 

headache better. She also states that dizziness has improved. I will discharge 

her home today but I would suggest beginning Celexa 10 day to help anxiety. I 

also did recommend make Compazine 10 mg 3 times a day when necessary for nausea 

and physical therapy to see if this improves the symptoms.

## 2019-04-02 ENCOUNTER — HOSPITAL ENCOUNTER (EMERGENCY)
Dept: HOSPITAL 7 - FB.ED | Age: 84
Discharge: HOME | End: 2019-04-02
Payer: MEDICARE

## 2019-04-02 VITALS — DIASTOLIC BLOOD PRESSURE: 88 MMHG | SYSTOLIC BLOOD PRESSURE: 172 MMHG

## 2019-04-02 DIAGNOSIS — R60.0: ICD-10-CM

## 2019-04-02 DIAGNOSIS — E03.9: ICD-10-CM

## 2019-04-02 DIAGNOSIS — I10: ICD-10-CM

## 2019-04-02 DIAGNOSIS — Z79.899: ICD-10-CM

## 2019-04-02 DIAGNOSIS — R79.89: ICD-10-CM

## 2019-04-02 DIAGNOSIS — Z88.8: ICD-10-CM

## 2019-04-02 DIAGNOSIS — R20.2: Primary | ICD-10-CM

## 2019-04-02 PROCEDURE — 83880 ASSAY OF NATRIURETIC PEPTIDE: CPT

## 2019-04-02 PROCEDURE — 70450 CT HEAD/BRAIN W/O DYE: CPT

## 2019-04-02 PROCEDURE — 84484 ASSAY OF TROPONIN QUANT: CPT

## 2019-04-02 PROCEDURE — 86140 C-REACTIVE PROTEIN: CPT

## 2019-04-02 PROCEDURE — 81001 URINALYSIS AUTO W/SCOPE: CPT

## 2019-04-02 PROCEDURE — 99284 EMERGENCY DEPT VISIT MOD MDM: CPT

## 2019-04-02 PROCEDURE — 85025 COMPLETE CBC W/AUTO DIFF WBC: CPT

## 2019-04-02 PROCEDURE — 36415 COLL VENOUS BLD VENIPUNCTURE: CPT

## 2019-04-02 PROCEDURE — 93005 ELECTROCARDIOGRAM TRACING: CPT

## 2019-04-02 PROCEDURE — 80053 COMPREHEN METABOLIC PANEL: CPT

## 2019-04-02 PROCEDURE — 71046 X-RAY EXAM CHEST 2 VIEWS: CPT

## 2019-04-02 NOTE — CT
INDICATION:  New confusion.



CT HEAD WITHOUT CONTRAST:  Spiral examination of the brain was obtained with 
sagittal and coronal reconstructions.  Initial axial images were obtained 04/02/
19 and findings were compared with 04/22/16.  Total exam DLP = 1,219.22 mGy-cm.



There is now noted postsurgical change at both globes with the orbits otherwise 
unremarkable.



Visualized paranasal sinuses and mastoid air cells appear to be well aerated.  



No cranial abnormality was seen.  



No shift of midline structures was seen.



Ventricles are slightly more prominent than on the previous examination, 
suggesting a mild degree of progressive central atrophy.  Cortical sulci are 
also slightly more prominent in appearance, suggesting mildly progressive 
cortical atrophy.  



White matter changes compatible with a mild degree of microvascular disease 
also appears slightly progressive.  



Calcifications are noted in the internal carotid and right vertebral arteries.  



No bleeding site or hematoma was seen - no definite acute intracranial 
abnormality was identified.  



IMPRESSION:

1.  No acute intracranial abnormality.

2.  Progressive mild microvascular disease type changes in the white matter 
with progressive generalized atrophy also to a mild degree.

3.  Cerebrovascular disease with arterial calcifications. 

4.  Postsurgical globes now bilateral - new on the left.  



Report was called to Dr. Mathur at 1055 hours on 04/02/19.

[id number]

## 2019-04-02 NOTE — EDM.PDOC
ED HPI GENERAL MEDICAL PROBLEM





- General


Stated Complaint: LEFT SIDE WEAKNESS


Time Seen by Provider: 19 09:40


Source of Information: Reports: Patient


History Limitations: Reports: No Limitations





- History of Present Illness


INITIAL COMMENTS - FREE TEXT/NARRATIVE: 





c/o tingling LUE





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





reported tingling of LUE, now no c/o tingle or pain





does have inc'd /87 most recently, however not given her AM pills, will 

give them here





does have mild confusion that pt says is new, knew day of week and month but 

could not think of year





no f/c/d, no n/v





has 2 daughters, one in [facility], one in [facility]





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 

anxiety, hyponatremia, enlarged heart





pt with Enterococcus faecalis UTI  when last seen in ED, sens all except 

moxiflox and TET





last head CT 2y ago, will repeat altho suspect infectious etiology for 

paresthesias and mild confusion





uses a walker, not fallen in past 6m





- Related Data


 Allergies











Allergy/AdvReac Type Severity Reaction Status Date / Time


 


acetazolamide Allergy  Other Verified 19 10:01





[From Diamox Sequels]     


 


atorvastatin [From Lipitor] Allergy  Other Verified 19 10:01


 


dorzolamide Allergy  Other Verified 19 10:01


 


Influenza Virus Vaccines Allergy  Other Verified 19 10:01


 


Penicillins Allergy  Hives Verified 17 13:00


 


prednisone Allergy  Cannot Verified 17 13:00





   Remember  


 


sulfamethoxazole Allergy  Cannot Verified 17 13:00





[From Bactrim]   Remember  


 


Tetanus Vaccines and Toxoid Allergy  Swelling Verified 17 13:00


 


trimethoprim [From Bactrim] Allergy  Cannot Verified 17 13:00





   Remember  


 


steroids Allergy  Swollen Uncoded 17 13:00





   Eyes  











Home Meds: 


 Home Meds





Bimatoprost [LUMIGAN 0.01% Ophth Soln] 1 drop EYEBOTH BEDTIME 16 [History]


Levothyroxine 75 mcg PO DAILY 16 [History]


Lisinopril 40 mg PO DAILY 16 [History]


Timolol Maleate [Timoptic 0.5% Ophth Soln] 1 drop EYELF BID 16 [History]


Aspirin [Adult Low Dose Aspirin EC] 81 mg PO DAILY 17 [History]


ClonazePAM [KlonoPIN] 0.25 mg PO BID #20 tablet 17 [Rx]


Lutein/Minerals/Vit A,C & E [Ocuvite] 1 tab PO DAILY 17 [History]


Citalopram [Citalopram HBr] 10 mg PO DAILY 17 [History]


Cranberry Extract [Cranberry] 300 mg PO DAILY 19 [History]


Furosemide [Lasix] 20 mg PO DAILY 19 [History]


Loratadine 10 mg PO DAILY 19 [History]


Metoprolol Tartrate 50 mg PO DAILY 19 [History]


Omega-3/DHA/Epa/Fish Oil [Omega 3 500 Softgel] 1,000 mg PO DAILY 19 [

History]











Past Medical History


HEENT History: Reports: Glaucoma


Cardiovascular History: Reports: Hypertension


Other Cardiovascular History: "MY HEART SKIPS A BEAT"


Respiratory History: Reports: None


Genitourinary History: Reports: None


OB/GYN History: Reports: Pregnancy


Other OB/GYN History: 


Musculoskeletal History: Reports: None


Neurological History: Reports: None


Endocrine/Metabolic History: Reports: Hypothyroidism, Other (See Below)


Other Endocrine/Metabolic History: THYROID


Hematologic History: Reports: None


Immunologic History: Reports: None


Oncologic (Cancer) History: Reports: None


Dermatologic History: Reports: None





- Infectious Disease History


Infectious Disease History: Reports: None





- Past Surgical History


Head Surgeries/Procedures: Reports: None


HEENT Surgical History: Reports: Tonsillectomy


Respiratory Surgical History: Reports: None


GI Surgical History: Reports: Appendectomy, Cholecystectomy


Female  Surgical History: Reports: Hysterectomy


Neurological Surgical History: Reports: None


Musculoskeletal Surgical History: Reports: None





Social & Family History





- Family History


Family Medical History: Noncontributory





- Caffeine Use


Caffeine Use: Reports: Soda


Other Caffeine Use: diet.





ED ROS GENERAL





- Review of Systems


Review Of Systems: See Below


Constitutional: Reports: No Symptoms


HEENT: Reports: No Symptoms


Respiratory: Reports: No Symptoms


Cardiovascular: Reports: No Symptoms


Endocrine: Reports: No Symptoms


GI/Abdominal: Reports: No Symptoms


: Reports: No Symptoms


Musculoskeletal: Reports: No Symptoms


Skin: Reports: No Symptoms


Neurological: Reports: Confusion, Paresthesia


Psychiatric: Reports: No Symptoms


Hematologic/Lymphatic: Reports: No Symptoms


Immunologic: Reports: No Symptoms





ED EXAM, GENERAL





- Physical Exam


Exam: See Below


General Appearance: Alert, WD/WN, No Apparent Distress, Other (alert, pleasant, 

follows commands, poor memory, talk complete sentence, NAD, nontoxic)


Eye Exam: Bilateral Eye: Other (dense opacity L cornea, R pupil 3 mm, reactive 

light, cannot count fingers)


Ears: Normal External Exam, Normal Canal, Hearing Grossly Normal


Nose: Normal Inspection, Normal Mucosa, No Blood


Throat/Mouth: Normal Inspection, Normal Lips, Normal Voice, No Airway Compromise


Head: Atraumatic, Normocephalic


Neck: Normal Inspection, Supple, Non-Tender, Full Range of Motion.  No: 

Lymphadenopathy (R), Lymphadenopathy (L)


Respiratory/Chest: No Respiratory Distress, Lungs Clear, Normal Breath Sounds, 

No Accessory Muscle Use, Chest Non-Tender


Cardiovascular: Regular Rate, Rhythm, Other (2/6 MAXIME at LSB, 2+ edema to knees b

/l, trace above knees, symmetric, shiny pretib skin and hairless c/w VSD)


GI/Abdominal: Normal Bowel Sounds, Soft, Non-Tender, No Distention, Other (

prominent, rounded).  No: Guarding, Rigid, Rebound


Back Exam: Normal Inspection, Full Range of Motion.  No: CVA Tenderness (R), 

CVA Tenderness (L)


Extremities: Non-Tender


Neurological: Alert, CN II-XII Intact, No Motor/Sensory Deficits, Other (hand 

 5/5, ankle flex/ext 5/5)


Psychiatric: Normal Affect, Normal Mood


Skin Exam: Warm, Dry, Intact, Normal Color, No Rash


Lymphatic: No Adenopathy





Course





- Vital Signs


Last Recorded V/S: 


 Last Vital Signs











Temp  36.8 C   19 09:25


 


Pulse  70   19 10:08


 


Resp  18   19 09:25


 


BP  199/101 H  19 10:08


 


Pulse Ox  97   19 09:25














- Orders/Labs/Meds


Orders: 


 Active Orders 24 hr











 Category Date Time Status


 


 EKG Documentation Completion [RC] ASDIRECTED Care  19 09:56 Active


 


 Head wo Cont [CT] Stat Exams  19 09:54 Taken


 


 EKG 12 Lead [EK] Routine Ther  19 09:55 Ordered











Labs: 


 Laboratory Tests











  19 Range/Units





  10:15 10:15 10:15 


 


WBC  5.2    (4.5-12.0)  X10-3/uL


 


RBC  4.83    (3.23-5.20)  x10(6)uL


 


Hgb  15.4    (11.5-15.5)  g/dL


 


Hct  46.2    (30.0-51.3)  %


 


MCV  95.6    (80-96)  fL


 


MCH  31.9    (27.7-33.6)  pg


 


MCHC  33.4    (32.2-35.4)  g/dL


 


RDW  12.7    (11.5-15.5)  %


 


Plt Count  167    (125-369)  X10(3)uL


 


MPV  7.0 L    (7.4-10.4)  fL


 


Add Manual Diff  Yes    


 


Neutrophils % (Manual)  77    (46-82)  %


 


Lymphocytes % (Manual)  17    (13-37)  %


 


Monocytes % (Manual)  5    (4-12)  %


 


Eosinophils % (Manual)  1    (0-5)  %


 


Sodium   141   (135-145)  mmol/L


 


Potassium   4.6   (3.5-5.3)  mmol/L


 


Chloride   103   (100-110)  mmol/L


 


Carbon Dioxide   33 H   (21-32)  mmol/L


 


BUN   11   (7-18)  mg/dL


 


Creatinine   0.8   (0.55-1.02)  mg/dL


 


Est Cr Clr Drug Dosing   TNP   


 


Estimated GFR (MDRD)   > 60   (>60)  


 


BUN/Creatinine Ratio   13.8   (9-20)  


 


Glucose   126 H   ()  mg/dL


 


Calcium   8.8   (8.6-10.2)  mg/dL


 


Total Bilirubin   0.8   (0.1-1.3)  mg/dL


 


AST   20   (5-25)  IU/L


 


ALT   16   (12-36)  U/L


 


Alkaline Phosphatase   64   ()  IU/L


 


Troponin I    < 0.017 L  (<0.017-0.056)  ng/mL


 


C-Reactive Protein    0.4 L  (0.5-0.9)  mg/dL


 


NT-Pro-B Natriuret Pep    2321 H*  (<=450)  pg/mL


 


Total Protein   7.1   (6.0-8.0)  g/dL


 


Albumin   3.8   (3.2-4.6)  g/dL


 


Globulin   3.3   g/dL


 


Albumin/Globulin Ratio   1.2   


 


Urine Color     (YELLOW)  


 


Urine Appearance     (CLEAR)  


 


Urine pH     (5.0-6.5)  


 


Ur Specific Gravity     (1.010-1.025)  


 


Urine Protein     (NEGATIVE)  mg/dL


 


Urine Glucose (UA)     (NORMAL)  mg/dL


 


Urine Ketones     (NEGATIVE)  mg/dL


 


Urine Occult Blood     (NEGATIVE)  


 


Urine Nitrite     (NEGATIVE)  


 


Urine Bilirubin     (NEGATIVE)  


 


Urine Urobilinogen     (NEGATIVE)  mg/dL


 


Ur Leukocyte Esterase     (NEGATIVE)  


 


Urine WBC     (0-5)  


 


Ur Squamous Epith Cells     (NS,R,O)  


 


Urine Bacteria     (NS)  














  19 Range/Units





  10:45 


 


WBC   (4.5-12.0)  X10-3/uL


 


RBC   (3.23-5.20)  x10(6)uL


 


Hgb   (11.5-15.5)  g/dL


 


Hct   (30.0-51.3)  %


 


MCV   (80-96)  fL


 


MCH   (27.7-33.6)  pg


 


MCHC   (32.2-35.4)  g/dL


 


RDW   (11.5-15.5)  %


 


Plt Count   (125-369)  X10(3)uL


 


MPV   (7.4-10.4)  fL


 


Add Manual Diff   


 


Neutrophils % (Manual)   (46-82)  %


 


Lymphocytes % (Manual)   (13-37)  %


 


Monocytes % (Manual)   (4-12)  %


 


Eosinophils % (Manual)   (0-5)  %


 


Sodium   (135-145)  mmol/L


 


Potassium   (3.5-5.3)  mmol/L


 


Chloride   (100-110)  mmol/L


 


Carbon Dioxide   (21-32)  mmol/L


 


BUN   (7-18)  mg/dL


 


Creatinine   (0.55-1.02)  mg/dL


 


Est Cr Clr Drug Dosing   


 


Estimated GFR (MDRD)   (>60)  


 


BUN/Creatinine Ratio   (9-20)  


 


Glucose   ()  mg/dL


 


Calcium   (8.6-10.2)  mg/dL


 


Total Bilirubin   (0.1-1.3)  mg/dL


 


AST   (5-25)  IU/L


 


ALT   (12-36)  U/L


 


Alkaline Phosphatase   ()  IU/L


 


Troponin I   (<0.017-0.056)  ng/mL


 


C-Reactive Protein   (0.5-0.9)  mg/dL


 


NT-Pro-B Natriuret Pep   (<=450)  pg/mL


 


Total Protein   (6.0-8.0)  g/dL


 


Albumin   (3.2-4.6)  g/dL


 


Globulin   g/dL


 


Albumin/Globulin Ratio   


 


Urine Color  Yellow  (YELLOW)  


 


Urine Appearance  Clear  (CLEAR)  


 


Urine pH  7.0 H  (5.0-6.5)  


 


Ur Specific Gravity  1.005 L  (1.010-1.025)  


 


Urine Protein  Negative  (NEGATIVE)  mg/dL


 


Urine Glucose (UA)  Normal  (NORMAL)  mg/dL


 


Urine Ketones  Negative  (NEGATIVE)  mg/dL


 


Urine Occult Blood  Negative  (NEGATIVE)  


 


Urine Nitrite  Negative  (NEGATIVE)  


 


Urine Bilirubin  Negative  (NEGATIVE)  


 


Urine Urobilinogen  Normal  (NEGATIVE)  mg/dL


 


Ur Leukocyte Esterase  Negative  (NEGATIVE)  


 


Urine WBC  0-5  (0-5)  


 


Ur Squamous Epith Cells  Few H  (NS,R,O)  


 


Urine Bacteria  Few H  (NS)  











Meds: 


Medications














Discontinued Medications














Generic Name Dose Route Start Last Admin





  Trade Name Freq  PRN Reason Stop Dose Admin


 


Lisinopril  40 mg  19 09:52  19 10:07





  Prinivil  PO  19 09:53  40 mg





  ONETIME ONE   Administration





     





     





     





     


 


Metoprolol Tartrate  50 mg  19 09:53  19 10:08





  Lopressor  PO  19 09:54  50 mg





  ONETIME ONE   Administration





     





     





     





     














- Re-Assessments/Exams


Free Text/Narrative Re-Assessment/Exam: 





19 12:07


head CT without is neg for old or new CVA as per Dr Fox, slight increase in 

microvascular changes c/w previous





CxR 2v with inc'd cardiomegaly without evidence of failure





u/a neg for infection





trop, CRP neg





Na WNL, inc'd BNP, however no comparison, HF does appear reasonably well 

compensated





EKG with afiab with rate control at 67, EKG  with SR at 56, call to PCP Dr Cortez showed afib present 1y ago





Dr Cortez recommended no change in meds with f/u with him or Benji Herrera next 

week





[facility], was inc'd on arrival but had not taken AM 

meds, which were given here, last /79, will get a 2nd dose of metoprolol 

at supper, meds can be adjusted next week by PCP





no evidence of CVA, cause of transient tingling in LUE is multifactorial with 

no acute process identiefied





will continue on ASA 81 mg daily





Departure





- Departure


Time of Disposition: 12:12


Disposition: Home, Self-Care 01


Condition: Good


Clinical Impression: 


 Paresthesia of left arm, Elevated blood pressure reading, Peripheral edema, 

Elevated brain natriuretic peptide (BNP) level








- Discharge Information


*PRESCRIPTION DRUG MONITORING PROGRAM REVIEWED*: Not Applicable


*COPY OF PRESCRIPTION DRUG MONITORING REPORT IN PATIENT STEPHANY: Not Applicable


Additional Instructions: 


Continue current meds.





Check and record BP 2 times daily for next week and take in report to PCP.





See Dr Cortez or Benji Herrera in one week.





- My Orders


Last 24 Hours: 


My Active Orders





19 09:54


Head wo Cont [CT] Stat 





19 09:55


EKG 12 Lead [EK] Routine 





19 09:56


EKG Documentation Completion [RC] ASDIRECTED 














- Assessment/Plan


Last 24 Hours: 


My Active Orders





19 09:54


Head wo Cont [CT] Stat 





19 09:55


EKG 12 Lead [EK] Routine 





19 09:56


EKG Documentation Completion [RC] ASDIRECTED

## 2019-04-02 NOTE — CR
INDICATION:  New confusion, question pneumonia.



CHEST:  PA and lateral views of the chest were obtained 04/02/19 and compared 
with 06/13/17 and revealed mildly flattened diaphragm leaves with a mild degree 
of hyperaeration, raising question of a mild degree of COPD.  



A nodular density in the upper lateral lung field on the right is unchanged and 
likely post granulomatous.  A definite consolidating pneumonia or effusion was 
not identified.  



The aorta is tortuous with calcification in the arch and descending portion.  
The heart is enlarged.  



There are some mild degenerative changes and minimal scoliosis in the spine.  



IMPRESSION:

1.  No definite acute process, it is difficult to entirely exclude minimal 
patchy bronchopneumonia at the medial lung bases with somewhat heavy markings 
again noted in those areas.  

2.  Progressive ASHD with probable significant increase in heart size.  

3.  Post granulomatous change.

4.  Possible COPD - correlate clinically.

MTDD